# Patient Record
Sex: MALE | Race: WHITE | Employment: STUDENT | URBAN - METROPOLITAN AREA
[De-identification: names, ages, dates, MRNs, and addresses within clinical notes are randomized per-mention and may not be internally consistent; named-entity substitution may affect disease eponyms.]

---
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
## 2021-11-05 PROBLEM — J98.2 PNEUMOMEDIASTINUM (HCC): Status: RESOLVED | Noted: 2017-12-19 | Resolved: 2021-11-05

## (undated) NOTE — LETTER
Date: 12/20/2017    Patient Name: Leigh Wong          To Whom it may concern: This letter has been written at the patient's request. The above patient was seen at the [facility] for treatment of a medical condition.     This patient reji